# Patient Record
Sex: MALE | Race: WHITE | NOT HISPANIC OR LATINO | URBAN - METROPOLITAN AREA
[De-identification: names, ages, dates, MRNs, and addresses within clinical notes are randomized per-mention and may not be internally consistent; named-entity substitution may affect disease eponyms.]

---

## 2014-01-06 LAB — CRC RECOMMENDATION EXT: NORMAL

## 2022-04-11 LAB — CRC RECOMMENDATION EXT: NORMAL

## 2023-06-06 DIAGNOSIS — E55.9 VITAMIN D DEFICIENCY, UNSPECIFIED: ICD-10-CM

## 2023-06-06 DIAGNOSIS — E78.5 HYPERLIPIDEMIA, UNSPECIFIED HYPERLIPIDEMIA TYPE: ICD-10-CM

## 2023-06-06 DIAGNOSIS — I10 HYPERTENSION, ESSENTIAL: Primary | ICD-10-CM

## 2023-06-06 DIAGNOSIS — Z79.899 ENCOUNTER FOR LONG-TERM (CURRENT) USE OF OTHER MEDICATIONS: ICD-10-CM

## 2023-06-07 DIAGNOSIS — E78.5 HYPERLIPIDEMIA, UNSPECIFIED HYPERLIPIDEMIA TYPE: Primary | ICD-10-CM

## 2023-06-07 RX ORDER — ATORVASTATIN CALCIUM 20 MG/1
20 TABLET, FILM COATED ORAL DAILY
Qty: 90 TABLET | Refills: 1 | Status: SHIPPED | OUTPATIENT
Start: 2023-06-07 | End: 2023-11-30 | Stop reason: SDUPTHER

## 2023-06-07 RX ORDER — ATORVASTATIN CALCIUM 20 MG/1
20 TABLET, FILM COATED ORAL DAILY
COMMUNITY
Start: 2023-03-18 | End: 2023-06-07 | Stop reason: SDUPTHER

## 2023-06-07 RX ORDER — DILTIAZEM HYDROCHLORIDE 120 MG/1
120 CAPSULE, COATED, EXTENDED RELEASE ORAL DAILY
COMMUNITY
Start: 2023-04-17 | End: 2023-07-10 | Stop reason: SDUPTHER

## 2023-06-07 RX ORDER — BENAZEPRIL HYDROCHLORIDE 40 MG/1
40 TABLET ORAL DAILY
COMMUNITY
Start: 2023-03-18 | End: 2023-12-21 | Stop reason: SDUPTHER

## 2023-06-16 LAB
CHOLEST SERPL-MSCNC: 146 MG/DL (ref 0–200)
HDLC SERPL-MCNC: 37 MG/DL (ref 35–70)
LDLC SERPL CALC-MCNC: 94 MG/DL
TRIGL SERPL-MCNC: 74 MG/DL (ref 40–160)

## 2023-06-17 LAB
CHOLEST SERPL-MSCNC: 146 MG/DL (ref 0–200)
HDLC SERPL-MCNC: 37 MG/DL (ref 35–70)
LDLC SERPL CALC-MCNC: 94 MG/DL (ref 0–160)
TRIGL SERPL-MCNC: 74 MG/DL (ref 40–160)

## 2023-07-06 ENCOUNTER — TELEPHONE (OUTPATIENT)
Dept: FAMILY MEDICINE | Facility: CLINIC | Age: 58
End: 2023-07-06
Payer: COMMERCIAL

## 2023-07-06 ENCOUNTER — DOCUMENTATION ONLY (OUTPATIENT)
Dept: FAMILY MEDICINE | Facility: CLINIC | Age: 58
End: 2023-07-06
Payer: COMMERCIAL

## 2023-07-06 NOTE — TELEPHONE ENCOUNTER
Notified patient of lab work results are within acceptable ranges for age and conditions. No change in therapy is requested at this time and continue with current treatment regimen. Keep follow up appointment as scheduled. Patient verbalizes understanding.

## 2023-07-10 DIAGNOSIS — I10 HYPERTENSION, UNSPECIFIED TYPE: Primary | ICD-10-CM

## 2023-07-10 RX ORDER — DILTIAZEM HYDROCHLORIDE 120 MG/1
120 CAPSULE, COATED, EXTENDED RELEASE ORAL DAILY
Qty: 90 CAPSULE | Refills: 2 | Status: SHIPPED | OUTPATIENT
Start: 2023-07-10 | End: 2023-12-21 | Stop reason: DRUGHIGH

## 2023-08-17 ENCOUNTER — TELEPHONE (OUTPATIENT)
Dept: FAMILY MEDICINE | Facility: CLINIC | Age: 58
End: 2023-08-17
Payer: COMMERCIAL

## 2023-11-09 RX ORDER — CHOLECALCIFEROL (VITAMIN D3) 25 MCG
2000 TABLET ORAL DAILY
COMMUNITY

## 2023-11-30 DIAGNOSIS — E78.5 HYPERLIPIDEMIA, UNSPECIFIED HYPERLIPIDEMIA TYPE: ICD-10-CM

## 2023-11-30 RX ORDER — ATORVASTATIN CALCIUM 20 MG/1
20 TABLET, FILM COATED ORAL DAILY
Qty: 90 TABLET | Refills: 1 | Status: SHIPPED | OUTPATIENT
Start: 2023-11-30 | End: 2023-12-21 | Stop reason: ALTCHOICE

## 2023-12-11 DIAGNOSIS — I10 HYPERTENSION, ESSENTIAL: Primary | ICD-10-CM

## 2023-12-11 DIAGNOSIS — Z12.5 SPECIAL SCREENING FOR MALIGNANT NEOPLASM OF PROSTATE: ICD-10-CM

## 2023-12-11 DIAGNOSIS — E55.9 VITAMIN D DEFICIENCY, UNSPECIFIED: ICD-10-CM

## 2023-12-11 DIAGNOSIS — E78.5 HYPERLIPIDEMIA, UNSPECIFIED HYPERLIPIDEMIA TYPE: ICD-10-CM

## 2023-12-11 DIAGNOSIS — Z79.899 ENCOUNTER FOR LONG-TERM (CURRENT) USE OF OTHER MEDICATIONS: ICD-10-CM

## 2023-12-16 LAB
25(OH)D3+25(OH)D2 SERPL-MCNC: 47.2 NG/ML (ref 30–100)
ALBUMIN SERPL-MCNC: 4.6 G/DL (ref 3.8–4.9)
ALP SERPL-CCNC: 55 IU/L (ref 44–121)
ALT SERPL-CCNC: 52 IU/L (ref 0–44)
AST SERPL-CCNC: 24 IU/L (ref 0–40)
BASOPHILS # BLD AUTO: 0.1 X10E3/UL (ref 0–0.2)
BASOPHILS NFR BLD AUTO: 1 %
BILIRUB DIRECT SERPL-MCNC: <0.2 MG/DL (ref 0–0.4)
BILIRUB SERPL-MCNC: 0.5 MG/DL (ref 0–1.2)
BUN SERPL-MCNC: 18 MG/DL (ref 8–27)
BUN/CREAT SERPL: 15 (ref 10–24)
CALCIUM SERPL-MCNC: 9.5 MG/DL (ref 8.6–10.2)
CHLORIDE SERPL-SCNC: 101 MMOL/L (ref 96–106)
CHOLEST SERPL-MCNC: 157 MG/DL (ref 100–199)
CK SERPL-CCNC: 348 U/L (ref 41–331)
CO2 SERPL-SCNC: 25 MMOL/L (ref 20–29)
CREAT SERPL-MCNC: 1.18 MG/DL (ref 0.76–1.27)
EOSINOPHIL # BLD AUTO: 0.1 X10E3/UL (ref 0–0.4)
EOSINOPHIL NFR BLD AUTO: 2 %
ERYTHROCYTE [DISTWIDTH] IN BLOOD BY AUTOMATED COUNT: 13.2 % (ref 11.6–15.4)
EST. GFR  (NO RACE VARIABLE): 71 ML/MIN/1.73
GLUCOSE SERPL-MCNC: 95 MG/DL (ref 70–99)
HCT VFR BLD AUTO: 50 % (ref 37.5–51)
HDLC SERPL-MCNC: 42 MG/DL
HGB BLD-MCNC: 16.3 G/DL (ref 13–17.7)
LDLC SERPL CALC-MCNC: 100 MG/DL (ref 0–99)
LYMPHOCYTES # BLD AUTO: 2.1 X10E3/UL (ref 0.7–3.1)
LYMPHOCYTES NFR BLD AUTO: 35 %
MCH RBC QN AUTO: 30.1 PG (ref 26.6–33)
MCHC RBC AUTO-ENTMCNC: 32.6 G/DL (ref 31.5–35.7)
MCV RBC AUTO: 92 FL (ref 79–97)
MONOCYTES # BLD AUTO: 0.5 X10E3/UL (ref 0.1–0.9)
MONOCYTES NFR BLD AUTO: 9 %
NEUTROPHILS # BLD AUTO: 3.2 X10E3/UL (ref 1.4–7)
NEUTROPHILS NFR BLD AUTO: 53 %
PLATELET # BLD AUTO: 238 X10E3/UL (ref 150–450)
POTASSIUM SERPL-SCNC: 4.9 MMOL/L (ref 3.5–5.2)
PROT SERPL-MCNC: 6.9 G/DL (ref 6–8.5)
PSA SERPL-MCNC: 0.2 NG/ML (ref 0–4)
RBC # BLD AUTO: 5.41 X10E6/UL (ref 4.14–5.8)
SODIUM SERPL-SCNC: 140 MMOL/L (ref 134–144)
TRIGL SERPL-MCNC: 76 MG/DL (ref 0–149)
TSH SERPL DL<=0.005 MIU/L-ACNC: 2.15 UIU/ML (ref 0.45–4.5)
URATE SERPL-MCNC: 6.7 MG/DL (ref 3.8–8.4)
VLDLC SERPL CALC-MCNC: 15 MG/DL (ref 5–40)
WBC # BLD AUTO: 5.9 X10E3/UL (ref 3.4–10.8)

## 2023-12-21 ENCOUNTER — OFFICE VISIT (OUTPATIENT)
Dept: FAMILY MEDICINE | Facility: CLINIC | Age: 58
End: 2023-12-21
Payer: COMMERCIAL

## 2023-12-21 VITALS
TEMPERATURE: 98 F | HEIGHT: 67 IN | WEIGHT: 234.38 LBS | OXYGEN SATURATION: 96 % | RESPIRATION RATE: 20 BRPM | BODY MASS INDEX: 36.79 KG/M2 | HEART RATE: 76 BPM | SYSTOLIC BLOOD PRESSURE: 138 MMHG | DIASTOLIC BLOOD PRESSURE: 88 MMHG

## 2023-12-21 DIAGNOSIS — G47.33 OSA (OBSTRUCTIVE SLEEP APNEA): ICD-10-CM

## 2023-12-21 DIAGNOSIS — K76.0 FATTY LIVER DISEASE, NONALCOHOLIC: ICD-10-CM

## 2023-12-21 DIAGNOSIS — E55.9 VITAMIN D DEFICIENCY: ICD-10-CM

## 2023-12-21 DIAGNOSIS — E66.01 CLASS 2 SEVERE OBESITY DUE TO EXCESS CALORIES WITH SERIOUS COMORBIDITY AND BODY MASS INDEX (BMI) OF 36.0 TO 36.9 IN ADULT: ICD-10-CM

## 2023-12-21 DIAGNOSIS — Z82.49 FAMILY HISTORY OF ARTERIOSCLEROTIC CARDIOVASCULAR DISEASE: ICD-10-CM

## 2023-12-21 DIAGNOSIS — Z00.00 ANNUAL PHYSICAL EXAM: Primary | ICD-10-CM

## 2023-12-21 DIAGNOSIS — R79.89 ELEVATED LFTS: ICD-10-CM

## 2023-12-21 DIAGNOSIS — I10 PRIMARY HYPERTENSION: ICD-10-CM

## 2023-12-21 DIAGNOSIS — E78.2 MIXED HYPERLIPIDEMIA: ICD-10-CM

## 2023-12-21 PROBLEM — E66.9 OBESE: Status: ACTIVE | Noted: 2023-12-21

## 2023-12-21 LAB
BILIRUB SERPL-MCNC: NORMAL MG/DL
BLOOD URINE, POC: NORMAL
CLARITY, POC UA: CLEAR
COLOR, POC UA: NORMAL
GLUCOSE UR QL STRIP: NORMAL
KETONES UR QL STRIP: NORMAL
LEUKOCYTE ESTERASE URINE, POC: NORMAL
NITRITE, POC UA: NORMAL
PH, POC UA: 6
POC CREATININE: 100 MG/DL (ref 0.6–1.3)
POC MICROALBUMIN URINE: 10
POC MICROALBUMIN/CREATININE RATIO: <30 ΜG/MG (ref 0–30)
PROTEIN, POC: NORMAL
SPECIFIC GRAVITY, POC UA: 1.02
UROBILINOGEN, POC UA: NORMAL

## 2023-12-21 PROCEDURE — 3008F PR BODY MASS INDEX (BMI) DOCUMENTED: ICD-10-PCS | Mod: CPTII,,, | Performed by: FAMILY MEDICINE

## 2023-12-21 PROCEDURE — 3075F SYST BP GE 130 - 139MM HG: CPT | Mod: CPTII,,, | Performed by: FAMILY MEDICINE

## 2023-12-21 PROCEDURE — 1159F MED LIST DOCD IN RCRD: CPT | Mod: CPTII,,, | Performed by: FAMILY MEDICINE

## 2023-12-21 PROCEDURE — 81002 POCT URINE DIPSTICK WITHOUT MICROSCOPE: ICD-10-PCS | Mod: ,,, | Performed by: FAMILY MEDICINE

## 2023-12-21 PROCEDURE — 1160F RVW MEDS BY RX/DR IN RCRD: CPT | Mod: CPTII,,, | Performed by: FAMILY MEDICINE

## 2023-12-21 PROCEDURE — 4010F PR ACE/ARB THEARPY RXD/TAKEN: ICD-10-PCS | Mod: CPTII,,, | Performed by: FAMILY MEDICINE

## 2023-12-21 PROCEDURE — 3079F PR MOST RECENT DIASTOLIC BLOOD PRESSURE 80-89 MM HG: ICD-10-PCS | Mod: CPTII,,, | Performed by: FAMILY MEDICINE

## 2023-12-21 PROCEDURE — 81002 URINALYSIS NONAUTO W/O SCOPE: CPT | Mod: ,,, | Performed by: FAMILY MEDICINE

## 2023-12-21 PROCEDURE — 99396 PREV VISIT EST AGE 40-64: CPT | Mod: ,,, | Performed by: FAMILY MEDICINE

## 2023-12-21 PROCEDURE — 82044 POCT MICROALBUMIN/CREATININE RATIO: ICD-10-PCS | Mod: QW,,, | Performed by: FAMILY MEDICINE

## 2023-12-21 PROCEDURE — 3079F DIAST BP 80-89 MM HG: CPT | Mod: CPTII,,, | Performed by: FAMILY MEDICINE

## 2023-12-21 PROCEDURE — 3008F BODY MASS INDEX DOCD: CPT | Mod: CPTII,,, | Performed by: FAMILY MEDICINE

## 2023-12-21 PROCEDURE — 99396 PR PREVENTIVE VISIT,EST,40-64: ICD-10-PCS | Mod: ,,, | Performed by: FAMILY MEDICINE

## 2023-12-21 PROCEDURE — 1159F PR MEDICATION LIST DOCUMENTED IN MEDICAL RECORD: ICD-10-PCS | Mod: CPTII,,, | Performed by: FAMILY MEDICINE

## 2023-12-21 PROCEDURE — 3075F PR MOST RECENT SYSTOLIC BLOOD PRESS GE 130-139MM HG: ICD-10-PCS | Mod: CPTII,,, | Performed by: FAMILY MEDICINE

## 2023-12-21 PROCEDURE — 82044 UR ALBUMIN SEMIQUANTITATIVE: CPT | Mod: QW,,, | Performed by: FAMILY MEDICINE

## 2023-12-21 PROCEDURE — 1160F PR REVIEW ALL MEDS BY PRESCRIBER/CLIN PHARMACIST DOCUMENTED: ICD-10-PCS | Mod: CPTII,,, | Performed by: FAMILY MEDICINE

## 2023-12-21 PROCEDURE — 4010F ACE/ARB THERAPY RXD/TAKEN: CPT | Mod: CPTII,,, | Performed by: FAMILY MEDICINE

## 2023-12-21 RX ORDER — ROSUVASTATIN CALCIUM 20 MG/1
20 TABLET, COATED ORAL DAILY
Qty: 90 TABLET | Refills: 3 | Status: SHIPPED | OUTPATIENT
Start: 2023-12-21

## 2023-12-21 RX ORDER — ASPIRIN 81 MG/1
81 TABLET ORAL DAILY
COMMUNITY

## 2023-12-21 RX ORDER — DILTIAZEM HYDROCHLORIDE 180 MG/1
180 CAPSULE, EXTENDED RELEASE ORAL DAILY
Qty: 30 CAPSULE | Refills: 11 | Status: SHIPPED | OUTPATIENT
Start: 2023-12-21 | End: 2023-12-22 | Stop reason: CLARIF

## 2023-12-21 RX ORDER — BENAZEPRIL HYDROCHLORIDE 40 MG/1
40 TABLET ORAL DAILY
Qty: 90 TABLET | Refills: 3 | Status: SHIPPED | OUTPATIENT
Start: 2023-12-21

## 2023-12-21 NOTE — ASSESSMENT & PLAN NOTE
Patient's LFTs are as follows: ALT 52 which is slightly elevated;  AST 24 which is normal;   ALK PHOS 55 which is normal . We will reassess his fatty liver by getting a FibroScan.

## 2023-12-21 NOTE — ASSESSMENT & PLAN NOTE
Patient's blood pressure is borderline high.  We will increase his diltiazem from 120 to 180 mg daily.

## 2023-12-21 NOTE — ASSESSMENT & PLAN NOTE
Lab Results   Component Value Date    TRIG 76 12/15/2023    HDL 42 12/15/2023    LDLCALC 100 (H) 12/15/2023   Patient is not at goal.   D/C Lipitor.  Will start Crestor 20 mg nightly.

## 2023-12-21 NOTE — PROGRESS NOTES
Family Medicine  Patient Name: Clarence Rodriguez     Patient ID: [id number]     Chief Complaint: Annual Exam      HPI:     Clarence Rodriguez is a 58 y.o. male here today for a wellness visit. Patient says he has been feeling fine.    274}  Health Maintenance         Date Due Completion Date    Hepatitis C Screening Never done ---    HIV Screening Never done ---    TETANUS VACCINE Never done ---    Hemoglobin A1c (Diabetic Prevention Screening) Never done ---    Colorectal Cancer Screening 01/06/2017 1/6/2014    RSV Vaccine (Age 60+ and Pregnant patients) (1 - 1-dose 60+ series) Never done ---    COVID-19 Vaccine (2 - 2023-24 season) 09/01/2023 8/19/2021    Lipid Panel 12/15/2028 12/15/2023             Past Medical History:   Diagnosis Date    Elevated ALT measurement     Hypertension     Mixed hyperlipidemia     Obese     MADISYN (obstructive sleep apnea)     Personal history of colonic polyps     Vitamin D deficiency         Past Surgical History:   Procedure Laterality Date    COLONOSCOPY  04/11/2022    Repeat 4 years    TENDON REPAIR Left 02/2010        Social History     Socioeconomic History    Marital status:     Number of children: 2   Tobacco Use    Smoking status: Never    Smokeless tobacco: Never   Substance and Sexual Activity    Alcohol use: Never     Social Determinants of Health     Financial Resource Strain: Low Risk  (12/21/2023)    Overall Financial Resource Strain (CARDIA)     Difficulty of Paying Living Expenses: Not hard at all   Food Insecurity: No Food Insecurity (12/21/2023)    Hunger Vital Sign     Worried About Running Out of Food in the Last Year: Never true     Ran Out of Food in the Last Year: Never true   Transportation Needs: No Transportation Needs (12/21/2023)    PRAPARE - Transportation     Lack of Transportation (Medical): No     Lack of Transportation (Non-Medical): No   Physical Activity: Inactive (12/21/2023)    Exercise Vital Sign     Days of Exercise per Week: 0 days      "Minutes of Exercise per Session: 0 min   Stress: No Stress Concern Present (12/21/2023)    Citizen of [place] [employer] - Occupational Stress Questionnaire     Feeling of Stress : Not at all   Social Connections: Moderately Integrated (12/21/2023)    Social Connection and Isolation Panel [NHANES]     Frequency of Communication with Friends and Family: More than three times a week     Frequency of Social Gatherings with Friends and Family: More than three times a week     Attends [employer] Services: More than 4 times per year     Active Member of Clubs or Organizations: No     Attends Club or Organization Meetings: Never     Marital Status:    Housing Stability: Low Risk  (12/21/2023)    Housing Stability Vital Sign     Unable to Pay for Housing in the Last Year: No     Number of Places Lived in the Last Year: 1     Unstable Housing in the Last Year: No        Current Outpatient Medications   Medication Instructions    aspirin (ECOTRIN) 81 mg, Oral, Daily    benazepriL (LOTENSIN) 40 mg, Oral, Daily    vitamin D (VITAMIN D3) 2,000 Units, Oral, Daily       Review of patient's allergies indicates:  No Known Allergies     Immunization History   Administered Date(s) Administered    COVID-19, MRNA, LN-S, PF (MODERNA FULL 0.5 ML DOSE) 08/19/2021    Influenza - Quadrivalent - PF *Preferred* (6 months and older) 09/28/2020, 11/29/2022    Zoster Recombinant 01/21/2021, 05/06/2021       Patient Care Team:  Aakash Hernandez Sr., MD as PCP - General (Family Medicine)  Tabitha Nelson, ALEX (Optometry)     Subjective:     Review of Systems    10 point review of systems conducted, negative except as stated in the history of present illness. See HPI for details.    Objective:     Visit Vitals  /88 (BP Location: Left arm, Patient Position: Sitting, BP Method: Large (Manual))   Pulse 76   Temp 97.7 °F (36.5 °C)   Resp 20   Ht 5' 7" (1.702 m)   Wt 106.3 kg (234 lb 6.4 oz)   SpO2 96%   BMI 36.71 kg/m² "       Physical Exam  Constitutional:       Appearance: He is obese.   HENT:      Head: Normocephalic and atraumatic.      Right Ear: Tympanic membrane, ear canal and external ear normal.      Left Ear: Tympanic membrane, ear canal and external ear normal.      Mouth/Throat:      Mouth: Mucous membranes are moist.   Eyes:      Extraocular Movements: Extraocular movements intact.      Conjunctiva/sclera: Conjunctivae normal.      Pupils: Pupils are equal, round, and reactive to light.   Cardiovascular:      Rate and Rhythm: Normal rate and regular rhythm.      Pulses:           Carotid pulses are 2+ on the right side and 2+ on the left side.       Dorsalis pedis pulses are 2+ on the right side and 2+ on the left side.        Posterior tibial pulses are 2+ on the right side and 2+ on the left side.   Pulmonary:      Effort: Pulmonary effort is normal.      Breath sounds: Normal breath sounds.   Abdominal:      Palpations: Abdomen is soft.   Musculoskeletal:         General: No swelling or tenderness. Normal range of motion.      Cervical back: Normal range of motion and neck supple.   Feet:      Right foot:      Protective Sensation: 3 sites tested.  3 sites sensed.      Skin integrity: Skin integrity normal. No ulcer, blister, skin breakdown, erythema, warmth, callus, dry skin or fissure.      Toenail Condition: Right toenails are normal.      Left foot:      Protective Sensation: 3 sites tested.  3 sites sensed.      Skin integrity: Skin integrity normal. No ulcer, blister, skin breakdown, erythema, warmth, callus, dry skin or fissure.      Toenail Condition: Left toenails are normal.   Lymphadenopathy:      Cervical: No cervical adenopathy.   Skin:     General: Skin is warm and dry.   Neurological:      General: No focal deficit present.      Mental Status: He is alert and oriented to person, place, and time.      Sensory: No sensory deficit.      Motor: No weakness.      Coordination: Coordination normal.    Psychiatric:         Mood and Affect: Mood normal.         Behavior: Behavior normal.         Thought Content: Thought content normal.         Judgment: Judgment normal.         Assessment:       ICD-10-CM ICD-9-CM   1. Annual physical exam  Z00.00 V70.0   2. Primary hypertension  I10 401.9   3. Mixed hyperlipidemia  E78.2 272.2   4. Fatty liver disease, nonalcoholic  K76.0 571.8   5. Class 2 severe obesity due to excess calories with serious comorbidity and body mass index (BMI) of 36.0 to 36.9 in adult  E66.01 278.01    Z68.36 V85.36   6. MADISYN (obstructive sleep apnea)  G47.33 327.23   7. Vitamin D deficiency  E55.9 268.9   8. Family history of arteriosclerotic cardiovascular disease  Z82.49 V17.49       Plan:   1. Annual physical exam  Overview:     Prostate Cancer Screening - PSA on 12/05/2023 was 0.2 ng/mL.  Colon Cancer Screening -01/26/2014   Eye Exam - Recommend annually. Followed by Dr. Nelson.  Dental Exam - Recommend biannually  Vaccinations -   Immunization History   Administered Date(s) Administered    COVID-19, MRNA, LN-S, PF (MODERNA FULL 0.5 ML DOSE) 08/19/2021    Influenza - Quadrivalent - PF *Preferred* (6 months and older) 09/28/2020, 11/29/2022    Zoster Recombinant 01/21/2021, 05/06/2021         Assessment & Plan:  Performed an EKG in the office:  Sinus rhythm with 71 beats per minute.  T-wave changes in the inferior leads were noted.    Orders:  -     POCT Microalbumin/Creatinine Ratio  -     POCT urine dipstick without microscope    2. Primary hypertension  Overview:  Low Sodium Diet (DASH Diet - Less than 2 grams of sodium per day).  Monitor blood pressure daily and log. Report consistent numbers greater than 140/90.  Maintain healthy weight with goal BMI <30. Exercise 30 minutes per day, 5 days per week.     Assessment & Plan:  Patient's blood pressure is borderline high.  We will increase his diltiazem from 120 to 180 mg daily.      3. Mixed hyperlipidemia  Overview:  Hyperlipidemia  Medications               atorvastatin (LIPITOR) 20 MG tablet Take 1 tablet (20 mg total) by mouth once daily.        Stressed importance of dietary modifications. Follow a low cholesterol, low saturated fat diet with less that 200mg of cholesterol a day.  Avoid fried foods and high saturated fats (high saturated fats less than 7% of calories).  Add Flax Seed/Fish Oil supplements to diet. Increase dietary fiber.  Regular exercise can reduce LDL and raise HDL. Stressed importance of physical activity 5 times per week for 30 minutes per day.      Assessment & Plan:  Lab Results   Component Value Date    TRIG 76 12/15/2023    HDL 42 12/15/2023    LDLCALC 100 (H) 12/15/2023   Patient is not at goal.   D/C Lipitor.  Will start Crestor 20 mg nightly.      4. Fatty liver disease, nonalcoholic  Overview:  Patient has had non alcoholic fatty liver disease for years now.  This was confirmed by ultrasound.    Assessment & Plan:  Patient's LFTs are as follows: ALT 52 which is slightly elevated;  AST 24 which is normal;   ALK PHOS 55 which is normal . We will reassess his fatty liver by getting a FibroScan.      5. Class 2 severe obesity due to excess calories with serious comorbidity and body mass index (BMI) of 36.0 to 36.9 in adult  Overview:  Body mass index is 36.71 kg/m².  Goal BMI <30.  Exercise 5 times a week for 30 minutes per day.  Avoid soda, simple sugars, excessive rice, potatoes or bread. Limit fast foods and fried foods.  Choose complex carbs in moderation (example: green vegetables, beans, oatmeal). Eat plenty of fresh fruits and vegetables with lean meats daily.  Do not skip meals. Eat a balanced portion size.  Avoid fad diets. Consider permanent healthy life style changes.       6. MADISYN (obstructive sleep apnea)  Overview:  Wears CPAP/BiPAP and reports compliance nightly. Life time use expected.  Has experienced improved daytime fatigue.  Avoid excessive alcohol, smoking and overuse of sedatives at  bedtime.  Weight management discussed. Goal BMI <30.  Adjust sleep position PRN.     Assessment & Plan:  Patient is well controlled.      7. Vitamin D deficiency  Overview:  Patient instructed to continue with OTC Vitamin D3 2,000 units daily.  Will continue to monitor.    Assessment & Plan:  Most recent Vitamin D 47.2 ng/mL on 12/05/2023.  Patient is at goal.      8. Family history of arteriosclerotic cardiovascular disease  Comments:  We will send patient for a coronary calcium score test.        [x] Discussed lab findings with the patient.  [x] Discussed diet, exercise and if appropriate, weight loss.  [x] Instructions and information, including risks and benefits of prescribed medication(s) have been reviewed with the patient and patient verbalizes understanding. Questions have been answered to the patient's satisfaction.  [] Appropriate counseling has been given regarding anxiety and depressive issues that were discussed today.  [] Any lab drawn today will be reviewed by physician at the time it is received and appropriate recommendations bill be made and discussed with patient.     Follow up in about 3 months (around 3/21/2024).   In addition to their scheduled follow up, the patient has also been instructed to follow up on as needed basis.     Future Appointments   Date Time Provider Department Center   3/14/2024  2:00 PM LAB, [facility]   3/21/2024  3:00 PM Aakash Hernandez Sr., MD [facility] [place]   12/17/2024  2:00 PM LAB, [facility]   12/23/2024  2:00 PM Aakash Hernandez Sr., MD [facility] [place] Leonard Jb Bourgeois Sr, MD

## 2023-12-21 NOTE — ASSESSMENT & PLAN NOTE
Performed an EKG in the office:  Sinus rhythm with 71 beats per minute.  T-wave changes in the inferior leads were noted.

## 2023-12-22 ENCOUNTER — TELEPHONE (OUTPATIENT)
Dept: FAMILY MEDICINE | Facility: CLINIC | Age: 58
End: 2023-12-22
Payer: COMMERCIAL

## 2023-12-22 DIAGNOSIS — I10 PRIMARY HYPERTENSION: Primary | ICD-10-CM

## 2023-12-22 DIAGNOSIS — I10 PRIMARY HYPERTENSION: ICD-10-CM

## 2023-12-22 RX ORDER — DILTIAZEM HYDROCHLORIDE 180 MG/1
180 CAPSULE, EXTENDED RELEASE ORAL DAILY
Qty: 90 CAPSULE | Refills: 0 | Status: SHIPPED | OUTPATIENT
Start: 2023-12-22

## 2023-12-22 RX ORDER — DILTIAZEM HYDROCHLORIDE 180 MG/1
180 CAPSULE, EXTENDED RELEASE ORAL DAILY
Qty: 90 CAPSULE | Refills: 3 | Status: SHIPPED | OUTPATIENT
Start: 2023-12-22 | End: 2023-12-22 | Stop reason: CLARIF

## 2023-12-22 NOTE — TELEPHONE ENCOUNTER
Refill for Diltiazem (dilacor xr) 180 mg needs to be only #90  .Cancelled org Rx for #90 X3   Should have been  #90  X1. But since it's in process  only send # 90.

## 2023-12-22 NOTE — TELEPHONE ENCOUNTER
Patient needs a 90 rx with 3 refills of Diltiazem to [facility].    Cancelled 30 day rx and resent 90 rx with 3 refills to [facility].

## 2023-12-27 ENCOUNTER — DOCUMENTATION ONLY (OUTPATIENT)
Dept: FAMILY MEDICINE | Facility: CLINIC | Age: 58
End: 2023-12-27
Payer: COMMERCIAL

## 2024-03-15 ENCOUNTER — TELEPHONE (OUTPATIENT)
Dept: FAMILY MEDICINE | Facility: CLINIC | Age: 59
End: 2024-03-15
Payer: COMMERCIAL

## 2024-03-15 NOTE — TELEPHONE ENCOUNTER
----- Message from Leidy Westfall sent at 3/15/2024 10:39 AM CDT -----  HE CALLED SAYING HE RECEIVED A TEXT FROM [facility] SAYING THAT WE HAVE NOT RESPONDED TO THEIR REFILL REQUEST FOR HIS CHAD 20MG

## 2024-03-15 NOTE — TELEPHONE ENCOUNTER
Called [facility] and they sent old Atorvastatin  request, Patient is now on Crestor 20 mg since 12/21/2023, They have new Rx . He will call [facility] .

## 2024-03-25 PROBLEM — Z00.00 ANNUAL PHYSICAL EXAM: Status: RESOLVED | Noted: 2023-12-21 | Resolved: 2024-03-25

## 2024-06-13 DIAGNOSIS — Z79.899 ENCOUNTER FOR LONG-TERM (CURRENT) USE OF OTHER MEDICATIONS: ICD-10-CM

## 2024-06-13 DIAGNOSIS — R79.89 ELEVATED LFTS: Primary | ICD-10-CM

## 2024-06-13 DIAGNOSIS — E78.2 MIXED HYPERLIPIDEMIA: ICD-10-CM

## 2024-06-13 DIAGNOSIS — E55.9 VITAMIN D DEFICIENCY: ICD-10-CM

## 2024-06-18 LAB
25(OH)D3+25(OH)D2 SERPL-MCNC: 34.9 NG/ML (ref 30–100)
ALBUMIN SERPL-MCNC: 4.3 G/DL (ref 3.9–4.9)
ALP SERPL-CCNC: 53 IU/L (ref 44–121)
ALT SERPL-CCNC: 43 IU/L (ref 0–44)
AST SERPL-CCNC: 23 IU/L (ref 0–40)
BASOPHILS # BLD AUTO: 0.1 X10E3/UL (ref 0–0.2)
BASOPHILS NFR BLD AUTO: 1 %
BILIRUB SERPL-MCNC: 0.3 MG/DL (ref 0–1.2)
BUN SERPL-MCNC: 16 MG/DL (ref 8–27)
BUN/CREAT SERPL: 15 (ref 10–24)
CALCIUM SERPL-MCNC: 9.2 MG/DL (ref 8.6–10.2)
CHLORIDE SERPL-SCNC: 103 MMOL/L (ref 96–106)
CHOLEST SERPL-MCNC: 134 MG/DL (ref 100–199)
CK SERPL-CCNC: 136 U/L (ref 41–331)
CO2 SERPL-SCNC: 25 MMOL/L (ref 20–29)
CREAT SERPL-MCNC: 1.09 MG/DL (ref 0.76–1.27)
EOSINOPHIL # BLD AUTO: 0.1 X10E3/UL (ref 0–0.4)
EOSINOPHIL NFR BLD AUTO: 3 %
ERYTHROCYTE [DISTWIDTH] IN BLOOD BY AUTOMATED COUNT: 13.4 % (ref 11.6–15.4)
EST. GFR  (NO RACE VARIABLE): 77 ML/MIN/1.73
GLOBULIN SER CALC-MCNC: 2.3 G/DL (ref 1.5–4.5)
GLUCOSE SERPL-MCNC: 98 MG/DL (ref 70–99)
HAV IGM SERPL QL IA: NEGATIVE
HBV CORE AB SERPL QL IA: NEGATIVE
HBV CORE IGM SERPL QL IA: NEGATIVE
HBV SURFACE AB SER QL: NON REACTIVE
HBV SURFACE AG SERPL QL IA: NEGATIVE
HCT VFR BLD AUTO: 47.6 % (ref 37.5–51)
HCV AB SERPL QL IA: NORMAL
HCV IGG SERPL QL IA: NON REACTIVE
HDLC SERPL-MCNC: 39 MG/DL
HGB BLD-MCNC: 15.4 G/DL (ref 13–17.7)
IMM GRANULOCYTES NFR BLD AUTO: 0 %
LDLC SERPL CALC-MCNC: 76 MG/DL (ref 0–99)
LYMPHOCYTES # BLD AUTO: 1.6 X10E3/UL (ref 0.7–3.1)
LYMPHOCYTES NFR BLD AUTO: 28 %
MCH RBC QN AUTO: 29.4 PG (ref 26.6–33)
MCHC RBC AUTO-ENTMCNC: 32.4 G/DL (ref 31.5–35.7)
MCV RBC AUTO: 91 FL (ref 79–97)
MONOCYTES # BLD AUTO: 0.4 X10E3/UL (ref 0.1–0.9)
MONOCYTES NFR BLD AUTO: 8 %
NEUTROPHILS # BLD AUTO: 3.4 X10E3/UL (ref 1.4–7)
NEUTROPHILS NFR BLD AUTO: 60 %
PLATELET # BLD AUTO: 203 X10E3/UL (ref 150–450)
POTASSIUM SERPL-SCNC: 5 MMOL/L (ref 3.5–5.2)
PROT SERPL-MCNC: 6.6 G/DL (ref 6–8.5)
RBC # BLD AUTO: 5.24 X10E6/UL (ref 4.14–5.8)
SODIUM SERPL-SCNC: 140 MMOL/L (ref 134–144)
TRIGL SERPL-MCNC: 99 MG/DL (ref 0–149)
TSH SERPL DL<=0.005 MIU/L-ACNC: 2.15 UIU/ML (ref 0.45–4.5)
URATE SERPL-MCNC: 5.6 MG/DL (ref 3.8–8.4)
VLDLC SERPL CALC-MCNC: 19 MG/DL (ref 5–40)
WBC # BLD AUTO: 5.5 X10E3/UL (ref 3.4–10.8)

## 2024-07-23 NOTE — ASSESSMENT & PLAN NOTE
At goal  Continue above meds at same dose    Low Sodium Diet (DASH Diet - Less than 2 grams of sodium per day).  Monitor blood pressure daily and log. Report consistent numbers greater than 140/90.  Maintain healthy weight with goal BMI <30. Exercise 30 minutes per day, 5 days per week.

## 2024-07-23 NOTE — ASSESSMENT & PLAN NOTE
Lab Results   Component Value Date    TRIG 99 06/17/2024    HDL 39 (L) 06/17/2024    LDLCALC 76 06/17/2024         The 10-year ASCVD risk score (Gloria HDZ, et al., 2019) is: 9.1%    Values used to calculate the score:      Age: 59 years      Sex: Male      Is Non- : No      Diabetic: No      Tobacco smoker: No      Systolic Blood Pressure: 128 mmHg      Is BP treated: Yes      HDL Cholesterol: 39 mg/dL      Total Cholesterol: 134 mg/dL    Risk-based lipoprotein goal stratification shows he is high risk so  LDL-C goal is below 100  Continue above medication at same dose  Repeat lipids 3 months

## 2024-07-23 NOTE — PROGRESS NOTES
Family Medicine    Patient ID: [id number]     Chief Complaint: Results (No concerns/)      HPI:     Clarence Rodriguez is a 59 y.o. male here today for a follow up.     Past Medical History:   Diagnosis Date    Elevated ALT measurement     Hypertension     Mixed hyperlipidemia     Obese     MADISYN (obstructive sleep apnea)     Personal history of colonic polyps     Vitamin D deficiency         Past Surgical History:   Procedure Laterality Date    COLONOSCOPY  04/11/2022    Repeat 4 years    TENDON REPAIR Left 02/2010        Social History     Tobacco Use    Smoking status: Never    Smokeless tobacco: Never   Substance and Sexual Activity    Alcohol use: Never    Drug use: Not on file    Sexual activity: Not on file        Current Outpatient Medications   Medication Instructions    aspirin (ECOTRIN) 81 mg, Oral, Daily    benazepriL (LOTENSIN) 40 mg, Oral, Daily    diltiaZEM (DILACOR XR) 180 mg, Oral, Daily    rosuvastatin (CRESTOR) 20 mg, Oral, Daily    vitamin D (VITAMIN D3) 2,000 Units, Oral, Daily    vitamin E 400 Units, Oral, 2 times daily       Review of patient's allergies indicates:  No Known Allergies     Patient Care Team:  Aakash Hernandez Sr., MD as PCP - General (Family Medicine)  Tabitha Nelson, OD (Optometry)     Subjective:     Review of Systems   Constitutional:  Negative for activity change, appetite change, fever and unexpected weight change.   HENT:  Negative for congestion, dental problem, rhinorrhea and trouble swallowing.    Eyes:  Negative for visual disturbance.   Respiratory:  Negative for chest tightness and shortness of breath.    Cardiovascular:  Negative for chest pain, palpitations and leg swelling.   Gastrointestinal:  Negative for abdominal pain, blood in stool, constipation, diarrhea, nausea and vomiting.   Genitourinary:  Negative for difficulty urinating.   Musculoskeletal:  Negative for gait problem.   Skin:  Negative for rash and wound.   Neurological:  Negative for dizziness,  "syncope, speech difficulty, weakness and light-headedness.   Psychiatric/Behavioral:  Negative for confusion and suicidal ideas.        12 point review of systems conducted, negative except as stated in the history of present illness. See HPI for details.    Objective:     Visit Vitals  /76   Pulse 84   Temp 97.6 °F (36.4 °C)   Resp 18   Ht 5' 7" (1.702 m)   Wt 105.2 kg (232 lb)   SpO2 95%   BMI 36.34 kg/m²       Physical Exam  Vitals and nursing note reviewed.   Constitutional:       General: He is not in acute distress.     Appearance: Normal appearance. He is not ill-appearing, toxic-appearing or diaphoretic.   HENT:      Head: Normocephalic and atraumatic.      Right Ear: Tympanic membrane, ear canal and external ear normal. There is no impacted cerumen.      Left Ear: Tympanic membrane, ear canal and external ear normal. There is no impacted cerumen.      Nose: No congestion or rhinorrhea.      Mouth/Throat:      Pharynx: Oropharynx is clear. No oropharyngeal exudate or posterior oropharyngeal erythema.   Eyes:      General:         Right eye: No discharge.         Left eye: No discharge.      Extraocular Movements: Extraocular movements intact.      Conjunctiva/sclera: Conjunctivae normal.   Cardiovascular:      Rate and Rhythm: Normal rate and regular rhythm.      Heart sounds: No murmur heard.     No friction rub. No gallop.   Pulmonary:      Effort: Pulmonary effort is normal. No respiratory distress.      Breath sounds: Normal breath sounds.   Musculoskeletal:      Right lower leg: No edema.      Left lower leg: No edema.   Skin:     Capillary Refill: Capillary refill takes less than 2 seconds.   Neurological:      Mental Status: He is alert and oriented to person, place, and time.   Psychiatric:         Mood and Affect: Mood normal.         Behavior: Behavior normal.         Thought Content: Thought content normal.         Labs Reviewed:     Chemistry:  Lab Results   Component Value Date     " "06/17/2024    K 5.0 06/17/2024    BUN 16 06/17/2024    CREATININE 1.09 06/17/2024    EGFRNORACEVR 77 06/17/2024    CALCIUM 9.2 06/17/2024    ALBUMIN 4.3 06/17/2024    BILIDIR <0.20 12/15/2023    AST 23 06/17/2024    ALT 43 06/17/2024    GJYJZSCM23FR 34.9 06/17/2024    TSH 2.150 06/17/2024        No results found for: "HGBA1C", "MICROALBCREA"     Hematology:  Lab Results   Component Value Date    WBC 5.5 06/17/2024    HGB 15.4 06/17/2024    HCT 47.6 06/17/2024     06/17/2024       Lipid Panel:  Lab Results   Component Value Date    CHOL 134 06/17/2024    HDL 39 (L) 06/17/2024    TRIG 99 06/17/2024        Urine:  No results found for: "COLORUA", "APPEARANCEUA", "SGUA", "PHUA", "PROTEINUA", "GLUCOSEUA", "KETONESUA", "BLOODUA", "NITRITESUA", "LEUKOCYTESUR", "RBCUA", "WBCUA", "BACTERIA", "SQEPUA", "HYALINECASTS", "CREATRANDUR", "PROTEINURINE", "UPROTCREA"     Assessment:       ICD-10-CM ICD-9-CM   1. Mixed hyperlipidemia  E78.2 272.2   2. Primary hypertension  I10 401.9   3. Vitamin D deficiency  E55.9 268.9   4. Class 2 severe obesity due to excess calories with serious comorbidity and body mass index (BMI) of 36.0 to 36.9 in adult  E66.01 278.01    Z68.36 V85.36     Plan:     1. Mixed hyperlipidemia  Overview:  Crestor 20 mg daily    Assessment & Plan:  Lab Results   Component Value Date    TRIG 99 06/17/2024    HDL 39 (L) 06/17/2024    LDLCALC 76 06/17/2024         The 10-year ASCVD risk score (Gloria HDZ, et al., 2019) is: 9.1%    Values used to calculate the score:      Age: 59 years      Sex: Male      Is Non- : No      Diabetic: No      Tobacco smoker: No      Systolic Blood Pressure: 128 mmHg      Is BP treated: Yes      HDL Cholesterol: 39 mg/dL      Total Cholesterol: 134 mg/dL    Risk-based lipoprotein goal stratification shows he is high risk so  LDL-C goal is below 100  Continue above medication at same dose  Repeat lipids 3 months        2. Primary " hypertension  Overview:  Diltiazem 180 mg daily  Benazepril 40 mg daily    Assessment & Plan:  At goal  Continue above meds at same dose    Low Sodium Diet (DASH Diet - Less than 2 grams of sodium per day).  Monitor blood pressure daily and log. Report consistent numbers greater than 140/90.  Maintain healthy weight with goal BMI <30. Exercise 30 minutes per day, 5 days per week.       3. Vitamin D deficiency  Overview:  Vitamin-D level today:  34  Goal vitamin-D 40-60 per endocrine society  Currently taking 2000 IU daily      Assessment & Plan:  3000 IU OTC vitamin-D once daily  Repeat vitamin-D in 6 months      4. Class 2 severe obesity due to excess calories with serious comorbidity and body mass index (BMI) of 36.0 to 36.9 in adult  Overview:  Body mass index is 36.71 kg/m².  Goal BMI <30.  Exercise 5 times a week for 30 minutes per day.  Avoid soda, simple sugars, excessive rice, potatoes or bread. Limit fast foods and fried foods.  Choose complex carbs in moderation (example: green vegetables, beans, oatmeal). Eat plenty of fresh fruits and vegetables with lean meats daily.  Do not skip meals. Eat a balanced portion size.  Avoid fad diets. Consider permanent healthy life style changes.            Follow up in about 6 months (around 1/24/2025) for With Labs Prior to Visit. In addition to their scheduled follow up, the patient has also been instructed to follow up on as needed basis.     Future Appointments   Date Time Provider Department Center   12/17/2024  2:00 PM LAB, [facility]   12/23/2024  2:00 PM Aakash Hernandez Sr., MD [facility] [place] David Trahan, MD

## 2024-07-24 ENCOUNTER — OFFICE VISIT (OUTPATIENT)
Dept: FAMILY MEDICINE | Facility: CLINIC | Age: 59
End: 2024-07-24
Payer: COMMERCIAL

## 2024-07-24 VITALS
HEIGHT: 67 IN | OXYGEN SATURATION: 95 % | RESPIRATION RATE: 18 BRPM | HEART RATE: 84 BPM | WEIGHT: 232 LBS | TEMPERATURE: 98 F | DIASTOLIC BLOOD PRESSURE: 76 MMHG | BODY MASS INDEX: 36.41 KG/M2 | SYSTOLIC BLOOD PRESSURE: 128 MMHG

## 2024-07-24 DIAGNOSIS — I10 PRIMARY HYPERTENSION: ICD-10-CM

## 2024-07-24 DIAGNOSIS — E66.01 CLASS 2 SEVERE OBESITY DUE TO EXCESS CALORIES WITH SERIOUS COMORBIDITY AND BODY MASS INDEX (BMI) OF 36.0 TO 36.9 IN ADULT: ICD-10-CM

## 2024-07-24 DIAGNOSIS — E78.2 MIXED HYPERLIPIDEMIA: Primary | ICD-10-CM

## 2024-07-24 DIAGNOSIS — E55.9 VITAMIN D DEFICIENCY: ICD-10-CM

## 2024-07-24 PROBLEM — E66.812 CLASS 2 SEVERE OBESITY DUE TO EXCESS CALORIES WITH SERIOUS COMORBIDITY AND BODY MASS INDEX (BMI) OF 36.0 TO 36.9 IN ADULT: Status: ACTIVE | Noted: 2023-12-21

## 2024-07-24 PROCEDURE — 99214 OFFICE O/P EST MOD 30 MIN: CPT | Mod: ,,, | Performed by: FAMILY MEDICINE

## 2024-07-24 RX ORDER — VITAMIN E 268 MG
400 CAPSULE ORAL 2 TIMES DAILY
COMMUNITY

## 2024-12-02 DIAGNOSIS — Z00.00 ANNUAL PHYSICAL EXAM: Primary | ICD-10-CM

## 2024-12-02 DIAGNOSIS — E55.9 VITAMIN D DEFICIENCY: ICD-10-CM

## 2024-12-16 ENCOUNTER — OFFICE VISIT (OUTPATIENT)
Dept: FAMILY MEDICINE | Facility: CLINIC | Age: 59
End: 2024-12-16
Payer: COMMERCIAL

## 2024-12-16 ENCOUNTER — DOCUMENTATION ONLY (OUTPATIENT)
Dept: FAMILY MEDICINE | Facility: CLINIC | Age: 59
End: 2024-12-16

## 2024-12-16 VITALS
SYSTOLIC BLOOD PRESSURE: 131 MMHG | WEIGHT: 218.63 LBS | BODY MASS INDEX: 34.31 KG/M2 | OXYGEN SATURATION: 96 % | TEMPERATURE: 98 F | HEIGHT: 67 IN | DIASTOLIC BLOOD PRESSURE: 77 MMHG | RESPIRATION RATE: 18 BRPM | HEART RATE: 88 BPM

## 2024-12-16 DIAGNOSIS — Z23 NEED FOR INFLUENZA VACCINATION: Primary | ICD-10-CM

## 2024-12-16 DIAGNOSIS — E78.2 MIXED HYPERLIPIDEMIA: ICD-10-CM

## 2024-12-16 DIAGNOSIS — I10 PRIMARY HYPERTENSION: ICD-10-CM

## 2024-12-16 DIAGNOSIS — K76.0 FATTY LIVER DISEASE, NONALCOHOLIC: ICD-10-CM

## 2024-12-16 PROCEDURE — 90471 IMMUNIZATION ADMIN: CPT | Mod: ,,, | Performed by: FAMILY MEDICINE

## 2024-12-16 PROCEDURE — 90656 IIV3 VACC NO PRSV 0.5 ML IM: CPT | Mod: ,,, | Performed by: FAMILY MEDICINE

## 2024-12-16 PROCEDURE — 99214 OFFICE O/P EST MOD 30 MIN: CPT | Mod: 25,,, | Performed by: FAMILY MEDICINE

## 2024-12-16 NOTE — PROGRESS NOTES
Family Medicine    Patient ID: [id number]     Chief Complaint: Annual Exam (Well exam and results)      HPI:     Clarence Rodriguez is a 59 y.o. male here today for lab follow up.    Past Medical History:   Diagnosis Date    Elevated ALT measurement     Hypertension     Mixed hyperlipidemia     Obese     MADISYN (obstructive sleep apnea)     Personal history of colonic polyps     Vitamin D deficiency         Past Surgical History:   Procedure Laterality Date    COLONOSCOPY  04/11/2022    Repeat 4 years    TENDON REPAIR Left 02/2010        Social History     Tobacco Use    Smoking status: Never    Smokeless tobacco: Never   Substance and Sexual Activity    Alcohol use: Never    Drug use: Not on file    Sexual activity: Not on file        Current Outpatient Medications   Medication Instructions    aspirin (ECOTRIN) 81 mg, Daily    benazepriL (LOTENSIN) 40 mg, Oral, Daily    diltiaZEM (DILACOR XR) 180 mg, Oral, Daily    rosuvastatin (CRESTOR) 20 mg, Oral, Daily    vitamin D (VITAMIN D3) 2,000 Units, Daily    vitamin E 400 Units, 2 times daily       Review of patient's allergies indicates:  No Known Allergies     Patient Care Team:  Aakash Hernandez Sr., MD as PCP - General (Family Medicine)  Tabitha Nelson, OD (Optometry)     Subjective:     Review of Systems   Constitutional:  Negative for activity change, appetite change, fever and unexpected weight change.   HENT:  Negative for congestion, dental problem, rhinorrhea and trouble swallowing.    Eyes:  Negative for visual disturbance.   Respiratory:  Negative for chest tightness and shortness of breath.    Cardiovascular:  Negative for chest pain, palpitations and leg swelling.   Gastrointestinal:  Negative for abdominal pain, blood in stool, constipation, diarrhea, nausea and vomiting.   Genitourinary:  Negative for difficulty urinating.   Musculoskeletal:  Negative for gait problem.   Skin:  Negative for rash and wound.   Neurological:  Negative for dizziness,  "syncope, speech difficulty, weakness and light-headedness.   Psychiatric/Behavioral:  Negative for confusion and suicidal ideas.        12 point review of systems conducted, negative except as stated in the history of present illness. See HPI for details.    Objective:     Visit Vitals  /77 (BP Location: Left arm, Patient Position: Sitting)   Pulse 88   Temp 97.9 °F (36.6 °C)   Resp 18   Ht 5' 7" (1.702 m)   Wt 99.2 kg (218 lb 9.6 oz)   SpO2 96%   BMI 34.24 kg/m²       Physical Exam  Vitals and nursing note reviewed.   Constitutional:       General: He is not in acute distress.     Appearance: Normal appearance. He is not ill-appearing, toxic-appearing or diaphoretic.   HENT:      Head: Normocephalic and atraumatic.      Right Ear: Tympanic membrane, ear canal and external ear normal. There is no impacted cerumen.      Left Ear: Tympanic membrane, ear canal and external ear normal. There is no impacted cerumen.      Nose: No congestion or rhinorrhea.      Mouth/Throat:      Pharynx: Oropharynx is clear. No oropharyngeal exudate or posterior oropharyngeal erythema.   Eyes:      General:         Right eye: No discharge.         Left eye: No discharge.      Extraocular Movements: Extraocular movements intact.      Conjunctiva/sclera: Conjunctivae normal.   Cardiovascular:      Rate and Rhythm: Normal rate and regular rhythm.      Heart sounds: No murmur heard.     No friction rub. No gallop.   Pulmonary:      Effort: Pulmonary effort is normal. No respiratory distress.      Breath sounds: Normal breath sounds.   Musculoskeletal:      Right lower leg: No edema.      Left lower leg: No edema.   Skin:     Capillary Refill: Capillary refill takes less than 2 seconds.   Neurological:      Mental Status: He is alert and oriented to person, place, and time.   Psychiatric:         Mood and Affect: Mood normal.         Behavior: Behavior normal.         Thought Content: Thought content normal.         Labs Reviewed: " "    Chemistry:  Lab Results   Component Value Date     06/17/2024    K 5.0 06/17/2024    BUN 16 06/17/2024    CREATININE 1.09 06/17/2024    EGFRNORACEVR 77 06/17/2024    CALCIUM 9.2 06/17/2024    ALBUMIN 4.3 06/17/2024    BILIDIR <0.20 12/15/2023    AST 23 06/17/2024    ALT 43 06/17/2024    SZOZIOXI35DV 34.9 06/17/2024    TSH 2.150 06/17/2024        No results found for: "HGBA1C", "MICROALBCREA"     Hematology:  Lab Results   Component Value Date    WBC 5.5 06/17/2024    HGB 15.4 06/17/2024    HCT 47.6 06/17/2024     06/17/2024       Lipid Panel:  Lab Results   Component Value Date    CHOL 134 06/17/2024    HDL 39 (L) 06/17/2024    TRIG 99 06/17/2024        Urine:  No results found for: "COLORUA", "APPEARANCEUA", "SGUA", "PHUA", "PROTEINUA", "GLUCOSEUA", "KETONESUA", "BLOODUA", "NITRITESUA", "LEUKOCYTESUR", "RBCUA", "WBCUA", "BACTERIA", "SQEPUA", "HYALINECASTS", "CREATRANDUR", "PROTEINURINE", "UPROTCREA"     Assessment:       ICD-10-CM ICD-9-CM   1. Need for influenza vaccination  Z23 V04.81   2. Primary hypertension  I10 401.9   3. Mixed hyperlipidemia  E78.2 272.2   4. Fatty liver disease, nonalcoholic  K76.0 571.8        Plan:     1. Need for influenza vaccination  -     influenza (Flulaval, Fluzone, Fluarix) 45 mcg/0.5 mL IM vaccine (> or = 6 mo) 0.5 mL    2. Primary hypertension  Overview:  Diltiazem 180 mg daily  Benazepril 40 mg daily    Assessment & Plan:  At goal  Continue above meds at same dose    Low Sodium Diet (DASH Diet - Less than 2 grams of sodium per day).  Monitor blood pressure daily and log. Report consistent numbers greater than 140/90.  Maintain healthy weight with goal BMI <30. Exercise 30 minutes per day, 5 days per week.     Orders:  -     CBC Auto Differential; Future; Expected date: 06/16/2025  -     Comprehensive Metabolic Panel; Future; Expected date: 06/16/2025  -     Lipid Panel; Future; Expected date: 06/16/2025  -     TSH; Future; Expected date: 06/16/2025  -     " Hemoglobin A1C; Future; Expected date: 06/16/2025  -     Urinalysis; Future; Expected date: 06/16/2025  -     Vitamin D; Future; Expected date: 06/16/2025    3. Mixed hyperlipidemia  Overview:  Crestor 20 mg daily    Assessment & Plan:  Lab Results   Component Value Date    TRIG 99 06/17/2024    HDL 39 (L) 06/17/2024    LDLCALC 76 06/17/2024         The 10-year ASCVD risk score (Gloria HDZ, et al., 2019) is: 9.5%    Values used to calculate the score:      Age: 59 years      Sex: Male      Is Non- : No      Diabetic: No      Tobacco smoker: No      Systolic Blood Pressure: 131 mmHg      Is BP treated: Yes      HDL Cholesterol: 39 mg/dL      Total Cholesterol: 134 mg/dL    High-risk category   Not at goal for LDL-C, current 119, goal below 100   At goal for non HDLC, currently 130, goal below 130  Declines Zetia today   Just started a new diet and has already lost weight   Repeat lipids six-month      Orders:  -     CBC Auto Differential; Future; Expected date: 06/16/2025  -     Comprehensive Metabolic Panel; Future; Expected date: 06/16/2025  -     Lipid Panel; Future; Expected date: 06/16/2025  -     TSH; Future; Expected date: 06/16/2025  -     Hemoglobin A1C; Future; Expected date: 06/16/2025  -     Urinalysis; Future; Expected date: 06/16/2025  -     Vitamin D; Future; Expected date: 06/16/2025    4. Fatty liver disease, nonalcoholic  Overview:  Patient has had non alcoholic fatty liver disease for years now.  This was confirmed by ultrasound.    Assessment & Plan:  Normal LFTs   We will continue to monitor         Received flu shot today, declines on others   Last PSA normal 2023, he has an appointment with his urologist in 2 months and he would like to let his urologist due his PSA  Last colonoscopy 2 years ago, not due for another 3 years    Follow up in about 6 months (around 6/16/2025) for Follow Up, With Labs Prior to Visit. In addition to their scheduled follow up, the patient has  also been instructed to follow up on as needed basis.     Future Appointments   Date Time Provider Department Center   6/16/2025  2:20 PM LAB, [facility] BRENDA Croft MD

## 2024-12-16 NOTE — ASSESSMENT & PLAN NOTE
Lab Results   Component Value Date    TRIG 99 06/17/2024    HDL 39 (L) 06/17/2024    LDLCALC 76 06/17/2024         The 10-year ASCVD risk score (Gloria HDZ, et al., 2019) is: 9.5%    Values used to calculate the score:      Age: 59 years      Sex: Male      Is Non- : No      Diabetic: No      Tobacco smoker: No      Systolic Blood Pressure: 131 mmHg      Is BP treated: Yes      HDL Cholesterol: 39 mg/dL      Total Cholesterol: 134 mg/dL    High-risk category   Not at goal for LDL-C, current 119, goal below 100   At goal for non HDLC, currently 130, goal below 130  Declines Zetia today   Just started a new diet and has already lost weight   Repeat lipids six-month

## 2024-12-18 DIAGNOSIS — E78.2 MIXED HYPERLIPIDEMIA: ICD-10-CM

## 2024-12-18 DIAGNOSIS — I10 PRIMARY HYPERTENSION: ICD-10-CM

## 2024-12-18 RX ORDER — ROSUVASTATIN CALCIUM 20 MG/1
20 TABLET, COATED ORAL DAILY
Qty: 90 TABLET | Refills: 3 | Status: SHIPPED | OUTPATIENT
Start: 2024-12-18

## 2024-12-18 RX ORDER — DILTIAZEM HYDROCHLORIDE 180 MG/1
180 CAPSULE, EXTENDED RELEASE ORAL DAILY
Qty: 90 CAPSULE | Refills: 0 | Status: SHIPPED | OUTPATIENT
Start: 2024-12-18

## 2024-12-18 RX ORDER — BENAZEPRIL HYDROCHLORIDE 40 MG/1
40 TABLET ORAL DAILY
Qty: 90 TABLET | Refills: 3 | Status: SHIPPED | OUTPATIENT
Start: 2024-12-18

## 2025-02-13 ENCOUNTER — TELEPHONE (OUTPATIENT)
Dept: FAMILY MEDICINE | Facility: CLINIC | Age: 60
End: 2025-02-13
Payer: COMMERCIAL

## 2025-02-21 ENCOUNTER — TELEPHONE (OUTPATIENT)
Dept: FAMILY MEDICINE | Facility: CLINIC | Age: 60
End: 2025-02-21
Payer: COMMERCIAL

## 2025-02-24 NOTE — TELEPHONE ENCOUNTER
Spoke to Ms. Dexter and it was confirmed that pt needs to be seen by us before he can use them for CPAP supplies. Ms. Dexter will call and let pt know.

## 2025-03-05 ENCOUNTER — TELEPHONE (OUTPATIENT)
Dept: PRIMARY CARE CLINIC | Facility: CLINIC | Age: 60
End: 2025-03-05
Payer: COMMERCIAL

## 2025-03-05 NOTE — TELEPHONE ENCOUNTER
Spoke with patient to remind him of his upcoming appointment on 3/10/25  at 1:20 pm.  Patient was reminded to bring his I.D., insurance and current medications in its original bottles.  Patient confirmed appointment and verbalized understanding.

## 2025-03-12 ENCOUNTER — OFFICE VISIT (OUTPATIENT)
Dept: PRIMARY CARE CLINIC | Facility: CLINIC | Age: 60
End: 2025-03-12
Payer: COMMERCIAL

## 2025-03-12 VITALS
SYSTOLIC BLOOD PRESSURE: 135 MMHG | DIASTOLIC BLOOD PRESSURE: 72 MMHG | WEIGHT: 200 LBS | BODY MASS INDEX: 31.39 KG/M2 | HEART RATE: 70 BPM | RESPIRATION RATE: 15 BRPM | TEMPERATURE: 98 F | OXYGEN SATURATION: 98 % | HEIGHT: 67 IN

## 2025-03-12 DIAGNOSIS — G47.33 OSA (OBSTRUCTIVE SLEEP APNEA): Primary | ICD-10-CM

## 2025-03-12 DIAGNOSIS — R73.03 PREDIABETES: ICD-10-CM

## 2025-03-12 DIAGNOSIS — E78.2 MIXED HYPERLIPIDEMIA: ICD-10-CM

## 2025-03-12 DIAGNOSIS — I10 PRIMARY HYPERTENSION: ICD-10-CM

## 2025-03-12 PROCEDURE — 99204 OFFICE O/P NEW MOD 45 MIN: CPT | Mod: ,,, | Performed by: STUDENT IN AN ORGANIZED HEALTH CARE EDUCATION/TRAINING PROGRAM

## 2025-03-12 RX ORDER — ROSUVASTATIN CALCIUM 20 MG/1
20 TABLET, COATED ORAL DAILY
Qty: 90 TABLET | Refills: 3 | Status: SHIPPED | OUTPATIENT
Start: 2025-03-12

## 2025-03-12 RX ORDER — BENAZEPRIL HYDROCHLORIDE 40 MG/1
40 TABLET ORAL DAILY
Qty: 90 TABLET | Refills: 3 | Status: SHIPPED | OUTPATIENT
Start: 2025-03-12

## 2025-03-12 RX ORDER — DILTIAZEM HYDROCHLORIDE 180 MG/1
180 CAPSULE, EXTENDED RELEASE ORAL DAILY
Qty: 90 CAPSULE | Refills: 3 | Status: SHIPPED | OUTPATIENT
Start: 2025-03-12

## 2025-03-12 NOTE — PROGRESS NOTES
Subjective:       Patient ID: Clarence Rodriguez is a 59 y.o. male.    -------------------------------------    Elevated ALT measurement    Hypertension    Mixed hyperlipidemia    Obese    MADISYN (obstructive sleep apnea)    Personal history of colonic polyps    Vitamin D deficiency        CHIEF COMPLAINT:  Patient presents today to establish care.    SLEEP APNEA:  He uses CPAP nightly for 5.5-6 hours. Sleep study in 2022 showed significant need for CPAP therapy. Current equipment includes full face mask, heated tubing, head strap, washable filter, and humidifier chamber. He needs a refill on his equipment/supplies.    WEIGHT MANAGEMENT:  He reports significant weight loss of 35 lbs since November through a low-carb diet, primarily consuming meat and eggs while avoiding rice, breads, and similar carbohydrate-rich foods. He has reached his goal weight of under 200 lbs and plans to incorporate more vegetables while maintaining healthy eating patterns.    MEDICAL HISTORY:  He has a history of elevated liver enzymes noted on multiple occasions and fatty liver disease previously diagnosed through imaging.    CURRENT MEDICATIONS:  He takes benazepril, diltiazem, and rosuvastatin for blood pressure and cholesterol management. His supplements include baby aspirin, vitamin D, and vitamin E.       Review of Systems   Constitutional:  Negative for chills and fever.   HENT:  Negative for sinus pressure/congestion and sore throat.    Respiratory:  Negative for cough, shortness of breath and wheezing.    Cardiovascular:  Negative for chest pain and leg swelling.   Gastrointestinal:  Negative for abdominal pain, nausea and vomiting.   Genitourinary:  Negative for dysuria and hematuria.   Musculoskeletal:  Negative for arthralgias and myalgias.   Integumentary:  Negative for rash.   Neurological:  Negative for dizziness and syncope.   Hematological:  Negative for adenopathy.   Psychiatric/Behavioral:  Negative for confusion. The patient  is not nervous/anxious.            Objective:      Physical Exam  Vitals and nursing note reviewed.   Constitutional:       General: He is not in acute distress.     Appearance: He is obese.   HENT:      Head: Normocephalic.      Nose: No rhinorrhea.   Eyes:      Conjunctiva/sclera: Conjunctivae normal.      Pupils: Pupils are equal, round, and reactive to light.   Cardiovascular:      Rate and Rhythm: Normal rate and regular rhythm.   Pulmonary:      Effort: Pulmonary effort is normal.      Breath sounds: Normal breath sounds.   Abdominal:      Palpations: Abdomen is soft.      Tenderness: There is no abdominal tenderness.   Musculoskeletal:         General: No deformity or signs of injury.   Skin:     General: Skin is warm and dry.   Neurological:      General: No focal deficit present.      Mental Status: He is alert. Mental status is at baseline.   Psychiatric:         Mood and Affect: Mood normal.         Behavior: Behavior normal.           Assessment & Plan:     OBSTRUCTIVE SLEEP APNEA:  - Ordered CPAP supplies including full face mask, heated tubing, head strap, chin strap, washable filter, humidifier chamber, and exhalation port.  - Noted the patient uses CPAP device nightly for an average of 5.5 to 6 hours per night.  - Assessed the patient's compliance with CPAP device and confirmed benefit from its use.  - Recommend continuation of CPAP    HYPERTENSION:  - Refilled benazepril and diltiazem.  - Noted blood pressure is elevated in the office but typically normal at home. Reviewed home readings.   - Advised the patient to bring home blood pressure readings to future visits for more accurate assessment.  - Scheduled a 6-month follow-up to ensure blood pressure remains controlled.    HYPERLIPIDEMIA:  - Refilled rosuvastatin.  - Reviewed December lab results showing increased cholesterol levels, especially LDL.  - Discussed the impact of the patient's recent high-protein diet on cholesterol levels.  - Advised  transitioning to a more balanced diet with healthier fats.  - Scheduled follow-up labs in June to reassess cholesterol levels.  - Explained relationship between high-protein/high-fat diet and cholesterol levels.  - Recommend considering swapping some red meat for seafood to increase omega-3 intake.  - Patient to replace some butter with olive oil for heart-healthy fats.    FATTY LIVER DISEASE:  - Educated the patient on fatty liver disease management through weight loss and dietary modifications.  - Noted improvement in recent liver enzyme tests despite high protein intake.  - Discussed the relationship between diet, weight loss, and fatty liver disease management.  - Scheduled follow-up labs in June to reassess liver function.    PREDIABETES:  - Discussed nuances of prediabetes and potential impact of diet changes on insulin resistance.  - Reviewed December lab results showing A1c of 5.8%, indicating prediabetes.  - Explained the complexity of prediabetes management in relation to the patient's current diet.  - Scheduled follow-up labs in June to reassess A1c levels.    WEIGHT LOSS:  - Noted significant weight loss of 35 lbs since November.  - Acknowledged patient's successful weight loss.  - Discussed the patient's weight loss in relation to recent diet changes.  - Advised transitioning to a more balanced diet while maintaining weight loss.            Follow up in about 3 months (around 6/12/2025) for HTN, Prediabetes (Labs). In addition to their scheduled follow up, the patient has also been instructed to follow up on as needed basis.     This note was generated with the assistance of ambient listening technology. Verbal consent was obtained by the patient and accompanying visitor(s) for the recording of patient appointment to facilitate this note. I attest to having reviewed and edited the generated note for accuracy, though some syntax or spelling errors may persist. Please contact the author of this note for  any clarification.

## 2025-06-09 ENCOUNTER — LAB VISIT (OUTPATIENT)
Dept: LAB | Facility: HOSPITAL | Age: 60
End: 2025-06-09
Attending: STUDENT IN AN ORGANIZED HEALTH CARE EDUCATION/TRAINING PROGRAM
Payer: COMMERCIAL

## 2025-06-09 DIAGNOSIS — E78.2 MIXED HYPERLIPIDEMIA: ICD-10-CM

## 2025-06-09 DIAGNOSIS — I10 PRIMARY HYPERTENSION: ICD-10-CM

## 2025-06-09 DIAGNOSIS — R73.03 PREDIABETES: ICD-10-CM

## 2025-06-09 LAB
ALBUMIN SERPL-MCNC: 4.1 G/DL (ref 3.4–4.8)
ALBUMIN/GLOB SERPL: 1.5 RATIO (ref 1.1–2)
ALP SERPL-CCNC: 44 UNIT/L (ref 40–150)
ALT SERPL-CCNC: 27 UNIT/L (ref 0–55)
ANION GAP SERPL CALC-SCNC: 6 MEQ/L
AST SERPL-CCNC: 17 UNIT/L (ref 11–45)
BILIRUB SERPL-MCNC: 0.3 MG/DL
BUN SERPL-MCNC: 28.6 MG/DL (ref 8.4–25.7)
CALCIUM SERPL-MCNC: 9 MG/DL (ref 8.8–10)
CHLORIDE SERPL-SCNC: 106 MMOL/L (ref 98–107)
CHOLEST SERPL-MCNC: 182 MG/DL
CHOLEST/HDLC SERPL: 4 {RATIO} (ref 0–5)
CO2 SERPL-SCNC: 27 MMOL/L (ref 23–31)
CREAT SERPL-MCNC: 0.96 MG/DL (ref 0.72–1.25)
CREAT/UREA NIT SERPL: 30
EST. AVERAGE GLUCOSE BLD GHB EST-MCNC: 108.3 MG/DL
GFR SERPLBLD CREATININE-BSD FMLA CKD-EPI: >60 ML/MIN/1.73/M2
GLOBULIN SER-MCNC: 2.8 GM/DL (ref 2.4–3.5)
GLUCOSE SERPL-MCNC: 94 MG/DL (ref 82–115)
HBA1C MFR BLD: 5.4 %
HDLC SERPL-MCNC: 44 MG/DL (ref 35–60)
LDLC SERPL CALC-MCNC: 125 MG/DL (ref 50–140)
POTASSIUM SERPL-SCNC: 5.2 MMOL/L (ref 3.5–5.1)
PROT SERPL-MCNC: 6.9 GM/DL (ref 5.8–7.6)
SODIUM SERPL-SCNC: 139 MMOL/L (ref 136–145)
TRIGL SERPL-MCNC: 66 MG/DL (ref 34–140)
VLDLC SERPL CALC-MCNC: 13 MG/DL

## 2025-06-09 PROCEDURE — 80053 COMPREHEN METABOLIC PANEL: CPT

## 2025-06-09 PROCEDURE — 80061 LIPID PANEL: CPT

## 2025-06-09 PROCEDURE — 36415 COLL VENOUS BLD VENIPUNCTURE: CPT

## 2025-06-09 PROCEDURE — 83036 HEMOGLOBIN GLYCOSYLATED A1C: CPT

## 2025-06-12 ENCOUNTER — OFFICE VISIT (OUTPATIENT)
Dept: PRIMARY CARE CLINIC | Facility: CLINIC | Age: 60
End: 2025-06-12
Payer: COMMERCIAL

## 2025-06-12 VITALS
BODY MASS INDEX: 30.54 KG/M2 | WEIGHT: 195 LBS | DIASTOLIC BLOOD PRESSURE: 74 MMHG | HEART RATE: 65 BPM | OXYGEN SATURATION: 98 % | RESPIRATION RATE: 15 BRPM | SYSTOLIC BLOOD PRESSURE: 128 MMHG

## 2025-06-12 DIAGNOSIS — R73.03 PREDIABETES: ICD-10-CM

## 2025-06-12 DIAGNOSIS — E66.09 CLASS 1 OBESITY DUE TO EXCESS CALORIES WITHOUT SERIOUS COMORBIDITY WITH BODY MASS INDEX (BMI) OF 30.0 TO 30.9 IN ADULT: ICD-10-CM

## 2025-06-12 DIAGNOSIS — E78.2 MIXED HYPERLIPIDEMIA: Primary | ICD-10-CM

## 2025-06-12 DIAGNOSIS — E66.811 CLASS 1 OBESITY DUE TO EXCESS CALORIES WITHOUT SERIOUS COMORBIDITY WITH BODY MASS INDEX (BMI) OF 30.0 TO 30.9 IN ADULT: ICD-10-CM

## 2025-06-12 PROCEDURE — 99214 OFFICE O/P EST MOD 30 MIN: CPT | Mod: ,,, | Performed by: STUDENT IN AN ORGANIZED HEALTH CARE EDUCATION/TRAINING PROGRAM

## 2025-06-12 RX ORDER — ROSUVASTATIN CALCIUM 40 MG/1
40 TABLET, COATED ORAL NIGHTLY
Qty: 90 TABLET | Refills: 3 | Status: SHIPPED | OUTPATIENT
Start: 2025-06-12

## 2025-06-12 NOTE — ASSESSMENT & PLAN NOTE
Has lost about 40 lbs over past year through diet and exercise. Congratulated pt and encouraged continued efforts. BMI is now bottom end of obesity range at 30.5.

## 2025-06-12 NOTE — PROGRESS NOTES
Subjective:       Patient ID: Clarence Rodriguez is a 60 y.o. male.    -------------------------------------    Elevated ALT measurement    Hypertension    Mixed hyperlipidemia    Obese    MADISYN (obstructive sleep apnea)    Personal history of colonic polyps    Vitamin D deficiency        CHIEF COMPLAINT:  Patient presents today for follow up of elevated A1C and liver enzymes    LABS:  A1C has improved from 5.8 to 5.4, indicating resolution of pre-diabetes concerns. Cholesterol panel shows total cholesterol of 182 and LDL of 125.    MEDICATIONS:  He is currently taking Crestor for cholesterol management. He reports previous muscle-related side effects from a prior cholesterol medication, specifically noting difficulty carrying heavy objects.    CARDIOVASCULAR HISTORY:  He denies any history of heart attack, stents, or blockages. He previously underwent a stress test, date unspecified. CT calcium score from December 2023 showed a result of 2.    FAMILY HISTORY:  He has a significant cardiovascular family history. His brother has heart problems, while his grandmother and mother have histories of hypertension and hypercholesterolemia.         Review of Systems   Constitutional:  Negative for chills and fever.   HENT:  Negative for sinus pressure/congestion.    Respiratory:  Negative for cough and shortness of breath.    Cardiovascular:  Negative for chest pain.   Gastrointestinal:  Negative for abdominal pain and nausea.   Genitourinary:  Negative for dysuria.   Musculoskeletal:  Negative for arthralgias.   Integumentary:  Negative for rash.   Neurological:  Negative for headaches.   Psychiatric/Behavioral:  The patient is not nervous/anxious.            Objective:      Physical Exam  Vitals and nursing note reviewed.   Constitutional:       General: He is not in acute distress.  HENT:      Head: Normocephalic.      Nose: No rhinorrhea.   Eyes:      General: No scleral icterus.  Cardiovascular:      Rate and Rhythm: Normal  rate.   Pulmonary:      Effort: Pulmonary effort is normal.   Musculoskeletal:         General: No deformity or signs of injury.   Skin:     General: Skin is warm and dry.   Neurological:      General: No focal deficit present.      Mental Status: He is alert. Mental status is at baseline.   Psychiatric:         Mood and Affect: Mood normal.         Behavior: Behavior normal.           Assessment & Plan:     1. Mixed hyperlipidemia  Assessment & Plan:  ASCVD is high at 11.9%  CT Calcium score 2 in 2023  Strong family history of CAD  We'll proceed with LDL goal <100  Increase Crestor to 40 mg daily      Orders:  -     rosuvastatin (CRESTOR) 40 MG Tab; Take 1 tablet (40 mg total) by mouth every evening.  Dispense: 90 tablet; Refill: 3    2. Prediabetes  Comments:  A1c improved from 5.8% to 5.4%. Resolving prediabetes from problem list.    3. Class 1 obesity due to excess calories without serious comorbidity with body mass index (BMI) of 30.0 to 30.9 in adult  Assessment & Plan:  Has lost about 40 lbs over past year through diet and exercise. Congratulated pt and encouraged continued efforts. BMI is now bottom end of obesity range at 30.5.          Follow up in about 6 months (around 12/12/2025) for Wellness. In addition to their scheduled follow up, the patient has also been instructed to follow up on as needed basis.     This note was generated with the assistance of ambient listening technology. Verbal consent was obtained by the patient and accompanying visitor(s) for the recording of patient appointment to facilitate this note. I attest to having reviewed and edited the generated note for accuracy, though some syntax or spelling errors may persist. Please contact the author of this note for any clarification.

## 2025-06-12 NOTE — ASSESSMENT & PLAN NOTE
ASCVD is high at 11.9%  CT Calcium score 2 in 2023  Strong family history of CAD  We'll proceed with LDL goal <100  Increase Crestor to 40 mg daily